# Patient Record
(demographics unavailable — no encounter records)

---

## 2024-12-17 NOTE — REASON FOR VISIT
[Follow-Up] : a follow-up evaluation of [[employer] ] : provided by [employer]   [Time Spent: ____ minutes] : Total time spent using  services: [unfilled] minutes. The patient's primary language is not English thus required  services. [Interpreters_IDNumber] : [id number] [TWNoteComboBox1] : Dutch

## 2024-12-17 NOTE — HISTORY OF PRESENT ILLNESS
[FreeTextEntry1] : 24yo  (LMP 24- after Nexplanon removal 10/29/24) presents for confirmation of pregnancy. desired.  +brown spotting  denies pain

## 2024-12-17 NOTE — PHYSICAL EXAM
[Labia Majora] : normal [Labia Minora] : normal [No Bleeding] : There was no active vaginal bleeding [Normal] : normal [Normal Position] : in a normal position [Tenderness] : nontender [Uterine Adnexae] : normal [FreeTextEntry5] : very friable-  bleeding w/ pap

## 2024-12-17 NOTE — DISCUSSION/SUMMARY
[FreeTextEntry1] : 24yo - confirmation of desired pregnancy - [ ]pap/ cultures obtained -- very friable cvx (bleeding from pap- not prior) - viable IUP  +fh  CRL ~6.5wks  (c/w LMP  EDC 25)  RTC 4 wks for PCAP SAB precautions reviewed Andrea Xavier [facility]